# Patient Record
(demographics unavailable — no encounter records)

---

## 2025-01-06 NOTE — HISTORY OF PRESENT ILLNESS
[FreeTextEntry1] : Patient is a 48 year old M with primary medical history of hyperlipidemia, prediabetes and hypothyroidism who presents for abnormal scrotal sonogram.   He had a routine/regular physical exam recently with PCP and noticed smaller right testicle.  He then underwent scrotal sonogram in Oct 2024.  Scrotal sonogram 10/21/24 in [facility] [place] noted 4mm area of hypoechogenicity in the right testicle. B/l testicular microlithiasis. Moderate left and small right hydroceles. Epididymal cysts.   He reports occasional pain on the tip of penis. No history of UTIs and STIs. Denies any voiding complaints.  Denies dysuria or hematuria.  Denies history of pelvic trauma or surgery.   Denies family history of  malignancy.

## 2025-01-06 NOTE — ASSESSMENT
[FreeTextEntry1] : Patient is a 49 yo M who presents for abnormal testicular sonogram - noted an area of hypoechogenicity and b/l testicular microlithiasis.  Also noted is b/l hydroceles and epididymal cysts. D/w pt that hydroceles are small and not clinically significant.  B/l epididymal cysts are benign.  D/w pt that there is no indication for intervention for these findings. However d/w pt that the area of hypoechogenicity in R testis is of unknown significance.  D/w pt would plan for repeat sono now given roughly 3 months since last sono.  If persistent or worsening area, d/w pt that would consider MRI of testis /- tumor markers thereafter  Also d/w pt that he has a L inguinal hernia on exam.  D/w pt that although it is reducible, pt does occasionally have discomfort.  D/w pt that there is risk of strangulation of hernia/bowel and recommend pt to see General surgeon to have hernia addressed surgically.  Pt agrees, contact info for General surgeon provided to pt  F/u after testicular sono

## 2025-01-06 NOTE — PHYSICAL EXAM
[General Appearance - Well Developed] : well developed [Normal Appearance] : normal appearance [General Appearance - In No Acute Distress] : no acute distress [Exaggerated Use Of Accessory Muscles For Inspiration] : no accessory muscle use [Abdomen Soft] : soft [Abdomen Tenderness] : non-tender [Urethral Meatus] : meatus normal [Penis Abnormality] : normal uncircumcised penis [Urinary Bladder Findings] : the bladder was normal on palpation [Scrotum] : the scrotum was normal [Testes Tenderness] : no tenderness of the testes [Testes Mass (___cm)] : there were no testicular masses [Normal Station and Gait] : the gait and station were normal for the patient's age [] : no rash [No Focal Deficits] : no focal deficits [Oriented To Time, Place, And Person] : oriented to person, place, and time [de-identified] : + L inguinal hernia - reducible [Chaperone Present] : A chaperone was present in the examining room during all aspects of the physical examination [FreeTextEntry2] : Sunny CRAIG

## 2025-01-13 NOTE — REVIEW OF SYSTEMS
[Negative] : Gastrointestinal [FreeTextEntry8] : Left inguinal hernia as per HPI, B/L hydroceles and epididymal cysts

## 2025-01-13 NOTE — HISTORY OF PRESENT ILLNESS
[de-identified] : Mandarin  Zana Bettina ([id number]) Patient referred by Dr. Phillip Abarca  Patient is a 48 year old male with PMHx of HLD, hypothyroidism, prediabetes, who was seen by Dr. Abarca and referred for finding of symptomatic left inguinal hernia. Patient states that he has had the hernia for several years, and that he does have intermittent episodes of discomfort that is associated with straining or seated position (patient works as an [employer] ). Patient denies N/V, having normal bowel function, and no other complaints at this time. Denies smoking, occasional EtOH, NKDA.

## 2025-01-13 NOTE — PHYSICAL EXAM
[Respiratory Effort] : normal respiratory effort [Normal Rate and Rhythm] : normal rate and rhythm [Alert] : alert [Oriented to Person] : oriented to person [Oriented to Place] : oriented to place [Oriented to Time] : oriented to time [Calm] : calm [de-identified] : NAD [de-identified] : Soft, non-distended, non-TTP in all quadrants, no rebound/guarding [de-identified] : Testes in normal anatomic position, no inguinal hernia on right, reducible bowel containing hernia on left [de-identified] : No skin changes to abdominal wall/inguinal region

## 2025-01-13 NOTE — PLAN
[FreeTextEntry1] : - I spoke at length with the patient regarding the findings, which are consistent with symptomatic reducible left inguinal hernia - Given these findings I offered the patient robotic-assisted laparoscopic, left, possible right, inguinal hernia repair with mesh, possible open, and we discussed the indications, and risks including but not limited to injury to nearby anatomical structures such as bowel, gonadal structures, bladder, vessels, pain, bleeding, infection, need for repeat procedure, recurrence, and the alternatives to which the patient stated that he understood, gave consent for the procedure, and all his questions were answered - Medical risk stratification and surgical planning - Patient was advised that in the interim, should he experience fever/chills, N/V, concern for incarceration/obstruction, to go to the ED for  evaluation, to which he stated he would do so

## 2025-03-03 NOTE — ASSESSMENT
[FreeTextEntry1] : Patient is a 49 yo M who presents for abnormal scrotal sono findings.  Prior questionable tiny mass - appears to have resolved. He still has stable bl testicular microlithiasis and benign cysts. D/w pt that historically microlithiasis was followed by serial imaging, however more recently, data/guidelines recommend no need for serial imaging unless there are risk factors (he has no risks factors of UDT, testis ca, fam hx of testis ca).  D/w pt that he should perform monthly self testicular exams F/u 1 yr   I have spent  20 minutes of time on the encounter including reviewing prior records, discussing with pt the above condition and various treatment options, and documentation.

## 2025-03-03 NOTE — PHYSICAL EXAM
[General Appearance - Well Developed] : well developed [Normal Appearance] : normal appearance [General Appearance - In No Acute Distress] : no acute distress [] : no respiratory distress [Exaggerated Use Of Accessory Muscles For Inspiration] : no accessory muscle use [Testes Tenderness] : no tenderness of the testes [Testes Mass (___cm)] : there were no testicular masses [de-identified] : +L hernia - nontender, not reducible [de-identified] : small hydroceles noted [Chaperone Present] : A chaperone was present in the examining room during all aspects of the physical examination [FreeTextEntry2] :  Yahaira Blanc NP

## 2025-04-07 NOTE — HISTORY OF PRESENT ILLNESS
[de-identified] : Mandarin  [id number] (Aretha)  Patient is a 48 year old male who underwent robotic-assisted laparoscopic left inguinal hernia repair with mesh on 3/14 who presents for postoperative follow up. States that he felt well after surgery, but two weeks ago patient was pushing a heavy cart and felt discomfort in the left inguinal region that was temporary, however it has been causing him concern ever since. Denies any other symptoms.  Examination of abdomen reveals well-healed incisions, no incisional hernias, non-TTP in all quadrants, no rebound/guarding. Testes in normal anatomic position, no recurrence of left inguinal hernia, mild swelling of the cord  Patient is healing well, reassured that no signs of recurrence found, but advised to avoid heavy lifting for another 2 weeks and to use ice packs and OTC pain medication PRN. Patient to follow with me in 2 months.